# Patient Record
Sex: MALE | Race: ASIAN | Employment: FULL TIME | URBAN - METROPOLITAN AREA
[De-identification: names, ages, dates, MRNs, and addresses within clinical notes are randomized per-mention and may not be internally consistent; named-entity substitution may affect disease eponyms.]

---

## 2017-03-27 PROBLEM — S69.81XA TFCC (TRIANGULAR FIBROCARTILAGE COMPLEX) INJURY, RIGHT, INITIAL ENCOUNTER: Status: ACTIVE | Noted: 2017-03-27

## 2017-03-27 PROCEDURE — 81003 URINALYSIS AUTO W/O SCOPE: CPT | Performed by: FAMILY MEDICINE

## 2017-03-27 PROCEDURE — 82378 CARCINOEMBRYONIC ANTIGEN: CPT | Performed by: FAMILY MEDICINE

## 2018-03-29 PROBLEM — S69.81XA TFCC (TRIANGULAR FIBROCARTILAGE COMPLEX) INJURY, RIGHT, INITIAL ENCOUNTER: Status: RESOLVED | Noted: 2017-03-27 | Resolved: 2018-03-29

## 2018-03-29 PROBLEM — R93.1 AGATSTON CAC SCORE, <100: Status: ACTIVE | Noted: 2018-03-29

## 2018-03-29 PROCEDURE — 81003 URINALYSIS AUTO W/O SCOPE: CPT | Performed by: FAMILY MEDICINE

## 2018-03-29 PROCEDURE — 82378 CARCINOEMBRYONIC ANTIGEN: CPT | Performed by: FAMILY MEDICINE

## 2018-08-24 PROBLEM — M19.041 ARTHRITIS OF FINGER OF BOTH HANDS: Status: ACTIVE | Noted: 2018-08-24

## 2018-08-24 PROBLEM — M65.331 TRIGGER MIDDLE FINGER OF RIGHT HAND: Status: ACTIVE | Noted: 2018-08-24

## 2018-08-24 PROBLEM — M19.042 ARTHRITIS OF FINGER OF BOTH HANDS: Status: ACTIVE | Noted: 2018-08-24

## 2018-09-05 PROBLEM — M79.644 FINGER PAIN, RIGHT: Status: ACTIVE | Noted: 2018-09-05

## 2019-07-01 PROCEDURE — 81003 URINALYSIS AUTO W/O SCOPE: CPT | Performed by: FAMILY MEDICINE

## 2019-07-16 ENCOUNTER — ANESTHESIA (OUTPATIENT)
Dept: ENDOSCOPY | Facility: HOSPITAL | Age: 69
End: 2019-07-16
Payer: COMMERCIAL

## 2019-07-16 ENCOUNTER — HOSPITAL ENCOUNTER (OUTPATIENT)
Facility: HOSPITAL | Age: 69
Setting detail: HOSPITAL OUTPATIENT SURGERY
Discharge: HOME | End: 2019-07-16
Attending: SURGERY | Admitting: SURGERY
Payer: COMMERCIAL

## 2019-07-16 ENCOUNTER — ANESTHESIA EVENT (OUTPATIENT)
Dept: ENDOSCOPY | Facility: HOSPITAL | Age: 69
End: 2019-07-16
Payer: COMMERCIAL

## 2019-07-16 VITALS
DIASTOLIC BLOOD PRESSURE: 61 MMHG | BODY MASS INDEX: 25.46 KG/M2 | HEART RATE: 65 BPM | RESPIRATION RATE: 18 BRPM | WEIGHT: 168 LBS | TEMPERATURE: 98 F | SYSTOLIC BLOOD PRESSURE: 108 MMHG | HEIGHT: 68 IN | OXYGEN SATURATION: 100 %

## 2019-07-16 DIAGNOSIS — Z86.010 HX OF COLONIC POLYPS: ICD-10-CM

## 2019-07-16 PROCEDURE — 88305 TISSUE EXAM BY PATHOLOGIST: CPT | Performed by: SURGERY

## 2019-07-16 PROCEDURE — 0DBL8ZX EXCISION OF TRANSVERSE COLON, VIA NATURAL OR ARTIFICIAL OPENING ENDOSCOPIC, DIAGNOSTIC: ICD-10-PCS | Performed by: SURGERY

## 2019-07-16 PROCEDURE — 0DBN8ZX EXCISION OF SIGMOID COLON, VIA NATURAL OR ARTIFICIAL OPENING ENDOSCOPIC, DIAGNOSTIC: ICD-10-PCS | Performed by: SURGERY

## 2019-07-16 RX ORDER — SODIUM CHLORIDE, SODIUM LACTATE, POTASSIUM CHLORIDE, CALCIUM CHLORIDE 600; 310; 30; 20 MG/100ML; MG/100ML; MG/100ML; MG/100ML
INJECTION, SOLUTION INTRAVENOUS CONTINUOUS
Status: DISCONTINUED | OUTPATIENT
Start: 2019-07-16 | End: 2019-07-16

## 2019-07-16 RX ORDER — NALOXONE HYDROCHLORIDE 0.4 MG/ML
80 INJECTION, SOLUTION INTRAMUSCULAR; INTRAVENOUS; SUBCUTANEOUS AS NEEDED
Status: DISCONTINUED | OUTPATIENT
Start: 2019-07-16 | End: 2019-07-16

## 2019-07-16 NOTE — OPERATIVE REPORT
[facility]    PATIENT'S NAME: Melquiades RODRIGUEZ   ATTENDING PHYSICIAN: Ludivina Chapman M.D. OPERATING PHYSICIAN: Ludivina Chapman M.D.    PATIENT ACCOUNT#:   [de-identified]    LOCATION:  [facility] ENDO POOL ROOMS 3 EDWP  MEDICAL RECORD #:   [id number]       DATE OF B Sigmoid colon polyp. PLAN:  Await final pathology for further recommendations regarding followup colonoscopy.     Dictated By Janet Madden M.D.  d: 07/16/2019 13:45:24  t: 07/16/2019 14:01:53  [facility] [id number]  /

## 2019-07-16 NOTE — ANESTHESIA POSTPROCEDURE EVALUATION
[facility] Patient Status:  Hospital Outpatient Surgery   Age/Gender 79year old male MRN [id number]   Location [facility]. Attending Edward Doherty MD   Hosp Day # 0 PCP Kathy Vides MD       Anesthesia Post-op Note

## 2019-07-16 NOTE — H&P
History and physical    Katie Queen Patient Status:  [facility] Outpatient Surgery    1950 MRN [id number]   [facility] ENDOSCOPY Attending Liz Momin MD   Hosp Day # 0 PCP Yancy Prince MD       Chief Complaint:    colonoscopy 10/5/2007    Performed by Philip Brenner MD at [address] MASS Right 3/7/2008    Performed by Philip Brenner MD at [address]    left inguinal   • PERIPHERAL VASCULAR SCRE 107/74, pulse 64, temperature 98.1 °F (36.7 °C), temperature source Oral, resp. rate 18, height 5' 8\" (1.727 m), weight 168 lb (76.2 kg), SpO2 97 %.     GENERAL: well developed, well nourished male, in no apparent distress    SKIN: anicteric    HEENT:  nor

## 2019-07-16 NOTE — ANESTHESIA PREPROCEDURE EVALUATION
PRE-OP EVALUATION    Patient Name: Samira Kahn    Pre-op Diagnosis: Hx of colonic polyps [Z86.010]    Procedure(s):  COLONOSCOPY, POSSIBLE BIOPSY, POSSIBLE POLYPECTOMY    Surgeon(s) and Role:     Tabatha Leos MD - Primary    Pre-op vitals reviewed. Performed by Jeanna Castillo MD at [address] [facility] HAND MASS/GANGLION Left 10/5/2007    Performed by Jeanna Castillo MD at [address] [facility]   • EXCISION OF LEG MASS Right 3/7/2008    Performed by Jeanna Castillo MD at [facility] vomiting,dental trauma,pain management modalities,transfusion if needed, etc. Questions answered. Accepts.     Plan/risks discussed with: patient and spouse                Present on Admission:  **None**

## 2019-07-16 NOTE — BRIEF OP NOTE
Pre-Operative Diagnosis: Hx of colonic polyps [Z86.010]     Post-Operative Diagnosis: colon polyps      Procedure Performed:   Procedure(s):  COLONOSCOPY with BIOPSY    Surgeon(s) and Role:     Paula Gandhi MD - Primary    Assistant(s):        Surgical F

## 2022-06-30 PROCEDURE — 88304 TISSUE EXAM BY PATHOLOGIST: CPT | Performed by: SURGERY

## 2022-07-01 ENCOUNTER — LAB REQUISITION (OUTPATIENT)
Dept: LAB | Facility: HOSPITAL | Age: 72
End: 2022-07-01
Payer: COMMERCIAL

## 2022-07-01 DIAGNOSIS — D17.9 BENIGN LIPOMATOUS NEOPLASM, UNSPECIFIED: ICD-10-CM

## 2022-07-01 DIAGNOSIS — L72.3 SEBACEOUS CYST: ICD-10-CM

## (undated) DEVICE — ENDOSCOPY PACK - LOWER: Brand: MEDLINE INDUSTRIES, INC.

## (undated) DEVICE — 3M™ RED DOT™ MONITORING ELECTRODE WITH FOAM TAPE AND STICKY GEL, 50/BAG, 20/CASE, 72/PLT 2570: Brand: RED DOT™

## (undated) DEVICE — REM POLYHESIVE ADULT PATIENT RETURN ELECTRODE: Brand: VALLEYLAB

## (undated) DEVICE — Device: Brand: DEFENDO AIR/WATER/SUCTION AND BIOPSY VALVE

## (undated) DEVICE — FORCEP RADIAL JAW 4

## (undated) DEVICE — FILTERLINE NASAL ADULT O2/CO2

## (undated) DEVICE — 1200CC GUARDIAN II: Brand: GUARDIAN